# Patient Record
Sex: MALE | Race: BLACK OR AFRICAN AMERICAN
[De-identification: names, ages, dates, MRNs, and addresses within clinical notes are randomized per-mention and may not be internally consistent; named-entity substitution may affect disease eponyms.]

---

## 2018-08-24 ENCOUNTER — HOSPITAL ENCOUNTER (EMERGENCY)
Dept: HOSPITAL 89 - ER | Age: 38
LOS: 1 days | Discharge: HOME | End: 2018-08-25
Payer: SELF-PAY

## 2018-08-24 VITALS — SYSTOLIC BLOOD PRESSURE: 142 MMHG | DIASTOLIC BLOOD PRESSURE: 93 MMHG

## 2018-08-24 DIAGNOSIS — L95.9: Primary | ICD-10-CM

## 2018-08-24 DIAGNOSIS — R21: ICD-10-CM

## 2018-08-24 PROCEDURE — 36416 COLLJ CAPILLARY BLOOD SPEC: CPT

## 2018-08-24 PROCEDURE — 82948 REAGENT STRIP/BLOOD GLUCOSE: CPT

## 2018-08-24 PROCEDURE — 99282 EMERGENCY DEPT VISIT SF MDM: CPT

## 2018-08-25 NOTE — ER REPORT
History and Physical


Time Seen By MD:  00:05


Hx. of Stated Complaint:  


PATIENTS LEGS STARTED TO SWELL AND PT GOT A RED RASH ON HIS LOWER LEGS; STATES 


THAT IT HAS NOT CAME DOWN; PAIN IS AN AN 6


HPI/ROS


CHIEF COMPLAINT: Leg swelling and redness





HISTORY OF PRESENT ILLNESS: 37-year-old black male presents ambulatory to the ER

concerned about redness and swelling in his legs over the last 2-3 days.  

Patient notes his legs are tender and painful.  There is edema.  He does take 

hydrochlorothiazide 25 mg for leg swelling.  His prescription is 2 years old.  

Patient has no medical insurance and goes to the [facility].  He notes no 

recent illness.  He notes no new medications.  Patient has significant leg 

swelling bilaterally.  There is an erythematous/vasculitic-appearing rash on 

both legs throughout the shin region nearly circumferential on both legs with 1+

edema.





REVIEW OF SYSTEMS:


Respiratory: No cough, no dyspnea.


Cardiovascular: No chest pain, no palpitations.


Gastrointestinal: No vomiting, no abdominal pain.


Musculoskeletal: No back pain.


Allergies:  


Coded Allergies:  


     No Known Drug Allergies (Unverified , 8/25/18)


Home Meds


Active Scripts


Cephalexin Monohydrate (CEPHALEXIN) 500 Mg Cap, 500 MG PO TID for infection, #30

CAP


   TAKE 1 CAPSULE BY MOUTH EVERY SIX HOURS


   Prov:MAXWELL CHOE DO         8/25/18


Prednisone (PREDNISONE) 20 Mg Tablet, 20 MG PO QDAY for allergic rash, #9


   2 pills by mouth daily 3 days then 1 by mouth daily 3 days


   Prov:MAXWELL CHOE DO         8/25/18


Hydrochlorothiazide (HYDROCHLOROTHIAZIDE) 25 Mg Tablet, 1 TAB PO QDAY for leg 

swelling, hypertension, #30 TAB


   Prov:MAXWLEL CHOE DO         8/25/18


Reported Medications


Amlodipine Besylate (AMLODIPINE BESYLATE) 5 Mg Tablet, 1 TAB PO QDAY, TAB


   8/25/18


Hydrochlorothiazide (HYDROCHLOROTHIAZIDE) 25 Mg Tablet, 1 TAB PO QDAY, TAB


   8/25/18


Reviewed Nurses Notes:  Yes


Old Medical Records Reviewed:  Yes


Hx Substance Use Disorder:  No


Constitutional





Vital Sign - Last 24 Hours








 8/24/18





 23:58


 


Temp 97.8


 


Pulse 92


 


Resp 18


 


B/P (MAP) 142/93


 


Pulse Ox 92


 


O2 Delivery Room Air








Physical Exam


  General Appearance: The patient is alert, has no immediate need for airway 

protection and no current signs of toxicity.  Vital signs stable, afebrile, 

pulse ox normal


HEENT: Pupils equal and round no injection.  TMs normal, oropharynx without 

redness or exudate, mucous members are moist


Respiratory: Chest is non tender, lungs are clear to auscultation.  No wheezing 

or rails


Cardiac: regular rate and rhythm, no murmur


Gastrointestinal: Abdomen is soft and non tender, no masses, bowel sounds 

normal.


Musculoskeletal:  Neck: Neck is supple and non tender.  No lymphadenopathy


Extremities have full range of motion and are non tender.  Bilateral 1+ edema.  

There is erythematous, maculopapular rash that blanches on compression.  It is 

tender to palp.  Is warm to the touch.  Distal neurovascular functions intact 

bilaterally


Skin: No rashes or lesions.





DIFFERENTIAL DIAGNOSIS: After history and physical exam differential diagnosis 

was considered for rash, erythema nodosum, vasculitis, palpable purpura, 

urticaria.





Medical Decision Making


Data Points


Laboratory





Hematology








Test


 8/25/18


00:25


 


Whole Blood Glucose


 105 mg/DL


()








Chemistry








Test


 8/25/18


00:25


 


Whole Blood Glucose


 105 mg/DL


()











ED Course/Re-evaluation


ED Course


Patient was admitted to an examination room.  H&P was done.  The differential 

diagnosis was considered.  On clinical sanitation.  Patient has bilateral 

vasculitic-appearing rash that is nearly can fluid on both shins spreading 

around to his calfs bilaterally.  There is 1+ edema bilaterally.  They're warm 

to the touch.  Patient's afebrile.  Patient is unable to afford an extensive 

evaluation to figure out what's causing his vasculitis.  His head or thighs 

thiazide could be a culprit as well as any infection.  She with prednisone 

taper.  40 mg for 3 days, 20 mg for 3 days.  He is given Keflex to cover him for

potential infection if it cellulitis, but I seem unlikely.  Patient advised to 

discontinue the hydrochlorothiazide until he follows up with the [place] 

[facility].  He may need another treatment for edema, and blood pressure.  Patient 

was also concerned about diabetes.  Fingerstick glucose came back at 105.


Decision to Disposition Date:  Aug 25, 2018


Decision to Disposition Time:  00:16





Depart


Departure


Latest Vital Signs





Vital Signs








  Date Time  Temp Pulse Resp B/P (MAP) Pulse Ox O2 Delivery O2 Flow Rate FiO2


 


8/24/18 23:58 97.8 92 18 142/93 92 Room Air  








Impression:  


   Primary Impression:  


   Skin rash


   Additional Impression:  


   Vasculitis


Condition:  Improved


Disposition:  HOME OR SELF-CARE


New Scripts


Cephalexin Monohydrate (CEPHALEXIN) 500 Mg Cap


500 MG PO TID for infection, #30 CAP


   TAKE 1 CAPSULE BY MOUTH EVERY SIX HOURS


   Prov: MAXWELL CHOE DO         8/25/18 


Prednisone (PREDNISONE) 20 Mg Tablet


20 MG PO QDAY for allergic rash, #9


   2 pills by mouth daily 3 days then 1 by mouth daily 3 days


   Prov: MAXWELL CHOE DO         8/25/18 


Hydrochlorothiazide (HYDROCHLOROTHIAZIDE) 25 Mg Tablet


1 TAB PO QDAY for leg swelling, hypertension, #30 TAB


   Prov: MAXWELL CHOE DO         8/25/18


Patient Instructions:  Acute Rash (ED)





Additional Instructions:  


Rash due to vasculitis


Take medication as prescribed


Follow-up with [place] clinic next week if unimproved





Problem Qualifiers











MAXWELL CHOE DO              Aug 25, 2018 00:07